# Patient Record
Sex: FEMALE | Race: WHITE | NOT HISPANIC OR LATINO | Employment: OTHER | URBAN - METROPOLITAN AREA
[De-identification: names, ages, dates, MRNs, and addresses within clinical notes are randomized per-mention and may not be internally consistent; named-entity substitution may affect disease eponyms.]

---

## 2017-06-14 ENCOUNTER — HISTORICAL (OUTPATIENT)
Dept: RADIOLOGY | Facility: HOSPITAL | Age: 57
End: 2017-06-14

## 2017-08-18 ENCOUNTER — HISTORICAL (OUTPATIENT)
Dept: ADMINISTRATIVE | Facility: HOSPITAL | Age: 57
End: 2017-08-18

## 2017-08-18 LAB
ALBUMIN SERPL-MCNC: 4.2 GM/DL (ref 3.4–5)
ALBUMIN/GLOB SERPL: 1 RATIO (ref 1–2)
ALP SERPL-CCNC: 83 UNIT/L (ref 45–117)
ALT SERPL-CCNC: 24 UNIT/L (ref 12–78)
APTT PPP: 28.1 SECOND(S) (ref 23.3–37)
AST SERPL-CCNC: 14 UNIT/L (ref 15–37)
BILIRUB SERPL-MCNC: 0.3 MG/DL (ref 0.2–1)
BILIRUBIN DIRECT+TOT PNL SERPL-MCNC: <0.1 MG/DL
BILIRUBIN DIRECT+TOT PNL SERPL-MCNC: >0.2 MG/DL
BUN SERPL-MCNC: 13 MG/DL (ref 7–18)
CALCIUM SERPL-MCNC: 9.6 MG/DL (ref 8.5–10.1)
CHLORIDE SERPL-SCNC: 103 MMOL/L (ref 98–107)
CO2 SERPL-SCNC: 32 MMOL/L (ref 21–32)
CREAT SERPL-MCNC: 0.7 MG/DL (ref 0.6–1.3)
ERYTHROCYTE [DISTWIDTH] IN BLOOD BY AUTOMATED COUNT: 12.2 % (ref 11.5–14.5)
GLOBULIN SER-MCNC: 3.9 GM/ML (ref 2.3–3.5)
GLUCOSE SERPL-MCNC: 82 MG/DL (ref 74–106)
HCT VFR BLD AUTO: 41.5 % (ref 35–46)
HGB BLD-MCNC: 13.6 GM/DL (ref 12–16)
INR PPP: 0.97 (ref 0.9–1.2)
MCH RBC QN AUTO: 29.8 PG (ref 26–34)
MCHC RBC AUTO-ENTMCNC: 32.8 GM/DL (ref 31–37)
MCV RBC AUTO: 91 FL (ref 80–100)
PLATELET # BLD AUTO: 332 X10(3)/MCL (ref 130–400)
PMV BLD AUTO: 10 FL (ref 7.4–10.4)
POTASSIUM SERPL-SCNC: 4.5 MMOL/L (ref 3.5–5.1)
PROT SERPL-MCNC: 8.1 GM/DL (ref 6.4–8.2)
PROTHROMBIN TIME: 12.7 SECOND(S) (ref 11.9–14.4)
RBC # BLD AUTO: 4.56 X10(6)/MCL (ref 4–5.2)
SODIUM SERPL-SCNC: 140 MMOL/L (ref 136–145)
WBC # SPEC AUTO: 7.9 X10(3)/MCL (ref 4.5–11)

## 2018-05-07 ENCOUNTER — HISTORICAL (OUTPATIENT)
Dept: ADMINISTRATIVE | Facility: HOSPITAL | Age: 58
End: 2018-05-07

## 2018-05-07 LAB
ALBUMIN SERPL-MCNC: 3.7 GM/DL (ref 3.4–5)
ALBUMIN/GLOB SERPL: 1 RATIO (ref 1–2)
ALP SERPL-CCNC: 83 UNIT/L (ref 45–117)
ALT SERPL-CCNC: 34 UNIT/L (ref 12–78)
APTT PPP: 27.6 SECOND(S) (ref 23.3–37)
AST SERPL-CCNC: 23 UNIT/L (ref 15–37)
BILIRUB SERPL-MCNC: 0.3 MG/DL (ref 0.2–1)
BILIRUBIN DIRECT+TOT PNL SERPL-MCNC: <0.1 MG/DL
BILIRUBIN DIRECT+TOT PNL SERPL-MCNC: ABNORMAL MG/DL
BUN SERPL-MCNC: 7 MG/DL (ref 7–18)
CALCIUM SERPL-MCNC: 9.3 MG/DL (ref 8.5–10.1)
CHLORIDE SERPL-SCNC: 104 MMOL/L (ref 98–107)
CO2 SERPL-SCNC: 28 MMOL/L (ref 21–32)
CREAT SERPL-MCNC: 0.8 MG/DL (ref 0.6–1.3)
ERYTHROCYTE [DISTWIDTH] IN BLOOD BY AUTOMATED COUNT: 12.8 % (ref 11.5–14.5)
GLOBULIN SER-MCNC: 4.1 GM/ML (ref 2.3–3.5)
GLUCOSE SERPL-MCNC: 88 MG/DL (ref 74–106)
HCT VFR BLD AUTO: 42.8 % (ref 35–46)
HGB BLD-MCNC: 13.7 GM/DL (ref 12–16)
INR PPP: 1.01 (ref 0.9–1.2)
MCH RBC QN AUTO: 29.2 PG (ref 26–34)
MCHC RBC AUTO-ENTMCNC: 32 GM/DL (ref 31–37)
MCV RBC AUTO: 91.3 FL (ref 80–100)
PLATELET # BLD AUTO: 267 X10(3)/MCL (ref 130–400)
PMV BLD AUTO: 10.3 FL (ref 7.4–10.4)
POTASSIUM SERPL-SCNC: 4.4 MMOL/L (ref 3.5–5.1)
PROT SERPL-MCNC: 7.8 GM/DL (ref 6.4–8.2)
PROTHROMBIN TIME: 12.6 SECOND(S) (ref 11.9–14.4)
RBC # BLD AUTO: 4.69 X10(6)/MCL (ref 4–5.2)
SODIUM SERPL-SCNC: 138 MMOL/L (ref 136–145)
WBC # SPEC AUTO: 9.9 X10(3)/MCL (ref 4.5–11)

## 2018-05-28 ENCOUNTER — HISTORICAL (OUTPATIENT)
Dept: SURGERY | Facility: HOSPITAL | Age: 58
End: 2018-05-28

## 2018-05-28 LAB
AMPHET UR QL SCN: NEGATIVE
BARBITURATE SCN PRESENT UR: NEGATIVE
BENZODIAZ UR QL SCN: NEGATIVE
CANNABINOIDS UR QL SCN: NEGATIVE
COCAINE UR QL SCN: POSITIVE
ETHANOL SERPL-MCNC: <3 MG/DL
OPIATES UR QL SCN: NEGATIVE
PCP UR QL: NEGATIVE
PH UR STRIP.AUTO: 6 [PH] (ref 5–8)
TEMPERATURE, URINE (OHS): 23.5 DEGC (ref 20–25)

## 2018-12-04 ENCOUNTER — HISTORICAL (OUTPATIENT)
Dept: RADIOLOGY | Facility: HOSPITAL | Age: 58
End: 2018-12-04

## 2018-12-14 ENCOUNTER — HISTORICAL (OUTPATIENT)
Dept: ADMINISTRATIVE | Facility: HOSPITAL | Age: 58
End: 2018-12-14

## 2018-12-14 LAB
APTT PPP: 27.7 SECOND(S) (ref 23.3–37)
BUN SERPL-MCNC: 17 MG/DL (ref 7–18)
CALCIUM SERPL-MCNC: 9.4 MG/DL (ref 8.5–10.1)
CHLORIDE SERPL-SCNC: 106 MMOL/L (ref 98–107)
CO2 SERPL-SCNC: 31 MMOL/L (ref 21–32)
CREAT SERPL-MCNC: 0.8 MG/DL (ref 0.6–1.3)
CREAT/UREA NIT SERPL: 21
ERYTHROCYTE [DISTWIDTH] IN BLOOD BY AUTOMATED COUNT: 13 % (ref 11.5–14.5)
GLUCOSE SERPL-MCNC: 83 MG/DL (ref 74–106)
HCT VFR BLD AUTO: 42.4 % (ref 35–46)
HGB BLD-MCNC: 13.8 GM/DL (ref 12–16)
INR PPP: 0.97 (ref 0.9–1.2)
MCH RBC QN AUTO: 30.1 PG (ref 26–34)
MCHC RBC AUTO-ENTMCNC: 32.5 GM/DL (ref 31–37)
MCV RBC AUTO: 92.6 FL (ref 80–100)
PLATELET # BLD AUTO: 294 X10(3)/MCL (ref 130–400)
PMV BLD AUTO: 10 FL (ref 7.4–10.4)
POTASSIUM SERPL-SCNC: 4.7 MMOL/L (ref 3.5–5.1)
PROTHROMBIN TIME: 12.8 SECOND(S) (ref 11.9–14.4)
RBC # BLD AUTO: 4.58 X10(6)/MCL (ref 4–5.2)
SODIUM SERPL-SCNC: 140 MMOL/L (ref 136–145)
WBC # SPEC AUTO: 10.1 X10(3)/MCL (ref 4.5–11)

## 2019-01-04 ENCOUNTER — HOSPITAL ENCOUNTER (OUTPATIENT)
Dept: MEDSURG UNIT | Facility: HOSPITAL | Age: 59
End: 2019-01-05
Attending: OTOLARYNGOLOGY | Admitting: OTOLARYNGOLOGY

## 2019-01-04 LAB
AMPHET UR QL SCN: NEGATIVE
BARBITURATE SCN PRESENT UR: NEGATIVE
BENZODIAZ UR QL SCN: NEGATIVE
CANNABINOIDS UR QL SCN: NEGATIVE
COCAINE UR QL SCN: NEGATIVE
ETHANOL SERPL-MCNC: 3 MG/DL
OPIATES UR QL SCN: NEGATIVE
PCP UR QL: NEGATIVE
PH UR STRIP.AUTO: 6 [PH] (ref 5–8)
T3FREE SERPL-MCNC: 1.93 PG/ML (ref 2.18–3.98)
T4 FREE SERPL-MCNC: 0.99 NG/DL (ref 0.76–1.46)
TEMPERATURE, URINE (OHS): 25 DEGC (ref 20–25)
TSH SERPL-ACNC: 0.55 MIU/L (ref 0.36–3.74)

## 2019-06-11 PROBLEM — M50.30 DEGENERATIVE DISC DISEASE, CERVICAL: Status: ACTIVE | Noted: 2019-06-11

## 2019-06-11 PROBLEM — M48.02 CERVICAL STENOSIS OF SPINAL CANAL: Status: ACTIVE | Noted: 2019-06-11

## 2020-08-03 ENCOUNTER — HISTORICAL (OUTPATIENT)
Dept: RADIOLOGY | Facility: HOSPITAL | Age: 60
End: 2020-08-03

## 2020-08-19 ENCOUNTER — HISTORICAL (OUTPATIENT)
Dept: RADIOLOGY | Facility: HOSPITAL | Age: 60
End: 2020-08-19

## 2021-08-23 ENCOUNTER — HISTORICAL (OUTPATIENT)
Dept: RADIOLOGY | Facility: HOSPITAL | Age: 61
End: 2021-08-23

## 2022-02-04 ENCOUNTER — HISTORICAL (OUTPATIENT)
Dept: RESPIRATORY THERAPY | Facility: HOSPITAL | Age: 62
End: 2022-02-04

## 2022-04-12 ENCOUNTER — HISTORICAL (OUTPATIENT)
Dept: ADMINISTRATIVE | Facility: HOSPITAL | Age: 62
End: 2022-04-12
Payer: MEDICAID

## 2022-04-30 VITALS
OXYGEN SATURATION: 98 % | HEIGHT: 65 IN | BODY MASS INDEX: 28.95 KG/M2 | WEIGHT: 173.75 LBS | SYSTOLIC BLOOD PRESSURE: 113 MMHG | DIASTOLIC BLOOD PRESSURE: 78 MMHG

## 2022-04-30 NOTE — DISCHARGE SUMMARY
Patient:   Chrissie Amezcua            MRN: [id number]            FIN: [id number]               Age:   58 years     Sex:  Female     :  1960   Associated Diagnoses:   None   Author:   Yumiko Mendez MD       OTOLARYNGOLOGY DISCHARGE SUMMARY     Admission Date: 2018  Discharge Date:   2018  Admitting Attending Physician: Dr. Diez  Discharging Attending Physician: Dr. Peters     Consulting Physician (s): None  Condition on discharge: Stable     Final diagnosis:   1) Multinodular goiter - symptomatic    Procedures:  1) Left thyroidectomy    HPI/ Hospital course: 59 yo F with multinodular goiter with symptoms who elected to have a total thyroidectomy after discussion of risks and benefits presneted for her procedure on 19. Patient underwent the produre the same day and was placed in ovservation post-operatively. Please review operativ enote for details. During removal of left thyroid, the left RLN was noted to be intact however, it did not stimulate persistently as a result of which an intr-op decision to stage her total thyroidectomy to present complications of bilateral RLN paresis and vocal cord dysfunction. Post-operatively, patient followed an expected course of recovery. Her voice was raspier than baseline however she had no difficuleties with PO. Her neck drain was removed on POD #1. She was discharged home on  with pain control meds, wound care, and follow up. Her total thryoidectomy will be staged if she is interested and she will require a FFL exam to evaluated her vocal cords at clinic follow-up.     Discharge Medications: Prescribed  acetaminophen-HYDROcodone (acetaminophen-hydrocodone 325 mg-5 mg oral tablet) 1 tab(s), Oral, q6hr, PRN pain, moderate  Continue  albuterol (VENTOLIN  MCG/ACT AERS)  fluticasone nasal ([facility] FLUTICASONE PROP 50 MCG)  gabapentin (GABAPENTIN 800 MG TABLET) 800 mg, Oral, TID  gabapentin (gabapentin 300 mg oral capsule) 300 mg, Oral,  TID  lansoprazole (LANSOPRAZOLE 30 MG CPDR)  loratadine (LORATADINE 10 MG TABLET) 10 mg, Oral, Daily  meloxicam (MELOXICAM 15 MG TABLET) 15 mg, Oral, Daily  traZODone (TRAZODONE 50 MG TABLET) 25 mg, Oral, qPM       Discharge Instructions:  No heavy lifting for 4 weeks  No contact sports  Regular renal/ cardiac diet      Wound care:  Please keep your wound clean and dry. It is okay to shower with running warm and soapy water however, do no soak. Avoid baths, rivers, ponds.     Follow Up: 01/17/19     Yumiko Mendez MD  [employer] Dept of Otolaryngology  [facility]

## 2022-05-05 NOTE — HISTORICAL [facility] CERNER
This is a historical note converted from [employer]. Formatting and pictures may have been removed.  Please reference [employer] for original formatting and attached multimedia. Indication for Surgery  59yo WF with MNG with compressive symptoms  Preoperative Diagnosis  multinodular goiter  Postoperative Diagnosis  multinodular goiter  Operation  Left thyroid lobectomy  Surgeon(s)  Thanh Diez (staff)  Anesthesia  GETA  Estimated Blood Loss  25cc  Findings  Enlarged, firm left thyroid lobe  Splayed RLN with poor stimulation at conclusion of case  Specimen(s)  left thyroid lobe  Complications  none  Technique  After informed consent was obtained and verified, the patient was brought to the operating suite and placed on the table in supine position. After adequate general anesthesia was obtained with NIM monitored ETT, time out was performed. Informed consent was reviewed. The table remained in place with the head of bed toward anesthesia. A shoulder roll was placed. Landmarks were palpated and marked including: thyroid notch, cricoid, and suprasternal notch. Planned incision site was marked and injected with 5 cc of 1% lidocaine with 1:100,000 epinephrine. Area was then prepped and?draped in a sterile fashion.?A number #15 blade was used to create a transverse incision approx. 2 cm above suprasternal notch. Dissection was carried out at midline through subcutaneous tissues with the use of Bovie cautery. Subplatysmal flaps were elevated superiorly to the thyroid notch and inferiorly to the clavicle. The fascia over the midline was incised using the Bovie monopolar cautery. The strap muscles were identified and divided in the midline through the median raphe and retracted.?  ?  The thyroid isthmus was encountered and the left enlarged thyroid lobe was dissected free from the deep infrahyoid muscles using blunt dissection and electrocautery. Kittners were used to bluntly dissect the thyroid  gland from the surrounding lateral tissues inferiorly and superiorly. The superior pole was identified, the vasculature was dissected until individual vessels were identified and each was ligated with either a 3-0 silk tie or cauterized using the Harmonic FOCUS+ Nate. Hemostasis was achieved and the superior pole was successfully freed from the pedicle, keeping right over the capsule of the gland. Parathyroid tissue was identified over the capsule of the gland upon inspection and this was dissected free and left in the neck. Attention was then returned to the lateral aspect of the gland. Further dissection was achieved via blunt dissection and the bulk of the gland was delivered out of the thyroid bed. Landmarks of the cricothyroid joint, the trachea, and esophagus were identified with palpation. The Lau dissector was used to spread parallel to the trajectory of the recurrent laryngeal nerve in the tissues above the tracheoesophageal groove, removing the fascial layers individually under direct inspection. The left recurrent laryngeal nerve was identified and noted to be somewhat splayed against the gland. The nerve was traced superiorly toward the cricothyroid joint. The thyroid gland was then meticulously dissected off the underlying tissues and trachea using the Lau and DeBakey forceps, bipolar cautery and Harmonic nate. Vasculature comprising the middle and inferior thyroid veins and the inferior thyroid artery were ligated by either tying with 3-0 silk suture or ligation with Harmonic nate. The gland was removed from Masters?s ligament over the trachea using Bovie monopolar cautery. All dissection was done with direct visualization of the recurrent laryngeal nerve. The thyroid gland was then divided through the isthmus using the Harmonic nate.?The nerve did have to be partially skeletonized to be freed from the gland. It was noted to be intact after?fully mobilizing the left thyroid lobe, however,  it did not reliably stimulate at this point. Thus it was decided to stage any further resection, especially without?known cancer diagnosis and primarily?management for compressive symptoms.?The?isthmus was then divided using Harmonic,?releasing the left thyroid lobe.  ?  The wound beds were copiously irrigated with saline, the bed was?then inspected and meticulous hemostasis was achieved. All instruments were removed from the patient. Surgicel was placed in the wound bed over the recurrent laryngeal nerve. A 10 mm perforated JAN drain was placed in the neck right of the incision with the drain crossing midline and running under the strap muscles. The drain was secured to the skin with a 2-0 silk suture. The strap muscles were re-approximated with a running 3-0 Vicryl suture. The platysma was then reapproximated with interrupted 3-0 Vicryl sutures. The deep tissue was reapproximated with 3-0 Vicryl suture. The skin was reapproximated with one running subcuticular 4-0 PDS suture with buried knots. The skin around the incision was cleaned. Steri-strips were placed over the incision with Dermabond. All counts were correct. The patient was returned to anesthesia for reversal, awakened and extubated without complication and transferred to recovery room in satisfactory condition
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
## 2022-09-19 ENCOUNTER — HOSPITAL ENCOUNTER (OUTPATIENT)
Dept: RADIOLOGY | Facility: HOSPITAL | Age: 62
Discharge: HOME | End: 2022-09-19
Attending: NURSE PRACTITIONER
Payer: MEDICAID

## 2022-09-19 DIAGNOSIS — E04.2 MULTINODULAR GOITER: ICD-10-CM

## 2022-09-19 PROCEDURE — 76536 US EXAM OF HEAD AND NECK: CPT | Mod: TC

## 2022-09-28 ENCOUNTER — OFFICE VISIT (OUTPATIENT)
Dept: OTOLARYNGOLOGY | Facility: CLINIC | Age: 62
End: 2022-09-28
Payer: MEDICAID

## 2022-09-28 VITALS
HEART RATE: 101 BPM | WEIGHT: 178.81 LBS | DIASTOLIC BLOOD PRESSURE: 80 MMHG | BODY MASS INDEX: 29.79 KG/M2 | HEIGHT: 65 IN | SYSTOLIC BLOOD PRESSURE: 115 MMHG | TEMPERATURE: 98 F

## 2022-09-28 DIAGNOSIS — J38.1 REINKE'S EDEMA OF VOCAL FOLDS: ICD-10-CM

## 2022-09-28 DIAGNOSIS — R09.89: Primary | ICD-10-CM

## 2022-09-28 DIAGNOSIS — R06.1 INSPIRATORY STRIDOR: ICD-10-CM

## 2022-09-28 PROCEDURE — 31575 DIAGNOSTIC LARYNGOSCOPY: CPT | Mod: PBBFAC | Performed by: NURSE PRACTITIONER

## 2022-09-28 PROCEDURE — 99214 OFFICE O/P EST MOD 30 MIN: CPT | Mod: PBBFAC | Performed by: NURSE PRACTITIONER

## 2022-09-28 PROCEDURE — 1159F PR MEDICATION LIST DOCUMENTED IN MEDICAL RECORD: ICD-10-PCS | Mod: CPTII,,, | Performed by: NURSE PRACTITIONER

## 2022-09-28 PROCEDURE — 3008F BODY MASS INDEX DOCD: CPT | Mod: CPTII,,, | Performed by: NURSE PRACTITIONER

## 2022-09-28 PROCEDURE — 31575 PR LARYNGOSCOPY, FLEXIBLE; DIAGNOSTIC: ICD-10-PCS | Mod: S$PBB,,, | Performed by: NURSE PRACTITIONER

## 2022-09-28 PROCEDURE — 3074F SYST BP LT 130 MM HG: CPT | Mod: CPTII,,, | Performed by: NURSE PRACTITIONER

## 2022-09-28 PROCEDURE — 3079F PR MOST RECENT DIASTOLIC BLOOD PRESSURE 80-89 MM HG: ICD-10-PCS | Mod: CPTII,,, | Performed by: NURSE PRACTITIONER

## 2022-09-28 PROCEDURE — 1159F MED LIST DOCD IN RCRD: CPT | Mod: CPTII,,, | Performed by: NURSE PRACTITIONER

## 2022-09-28 PROCEDURE — 3074F PR MOST RECENT SYSTOLIC BLOOD PRESSURE < 130 MM HG: ICD-10-PCS | Mod: CPTII,,, | Performed by: NURSE PRACTITIONER

## 2022-09-28 PROCEDURE — 99215 PR OFFICE/OUTPT VISIT, EST, LEVL V, 40-54 MIN: ICD-10-PCS | Mod: 25,S$PBB,, | Performed by: NURSE PRACTITIONER

## 2022-09-28 PROCEDURE — 99215 OFFICE O/P EST HI 40 MIN: CPT | Mod: 25,S$PBB,, | Performed by: NURSE PRACTITIONER

## 2022-09-28 PROCEDURE — 3008F PR BODY MASS INDEX (BMI) DOCUMENTED: ICD-10-PCS | Mod: CPTII,,, | Performed by: NURSE PRACTITIONER

## 2022-09-28 PROCEDURE — 31575 DIAGNOSTIC LARYNGOSCOPY: CPT | Mod: S$PBB,,, | Performed by: NURSE PRACTITIONER

## 2022-09-28 PROCEDURE — 3079F DIAST BP 80-89 MM HG: CPT | Mod: CPTII,,, | Performed by: NURSE PRACTITIONER

## 2022-09-28 RX ORDER — PANTOPRAZOLE SODIUM 40 MG/1
40 TABLET, DELAYED RELEASE ORAL DAILY
COMMUNITY
Start: 2022-09-23

## 2022-09-28 RX ORDER — LIDOCAINE HYDROCHLORIDE 40 MG/ML
1 INJECTION, SOLUTION RETROBULBAR ONCE
Status: DISPENSED | OUTPATIENT
Start: 2022-09-28

## 2022-09-28 RX ORDER — BUPROPION HYDROCHLORIDE 150 MG/1
150 TABLET, EXTENDED RELEASE ORAL 2 TIMES DAILY
COMMUNITY
Start: 2022-09-09

## 2022-09-28 NOTE — PROGRESS NOTES
The scope used for the exam was:  Flexible scope ENF-P4  Serial Number:  1)    [id number]    []   2)    [id number]    []   3)    [id number]    [x]   4)    [id number]    []   5)    [id number]    []   6)    [id number]    []       The scope used for the exam was:  Rigid scope   Serial Number:  1)   [id number]    []   2)   [id number]    []   3)   [id number]    []   4)    3384   []   5)    0824   []   6)    5554   []     7)   7425    []   8)   2240    []   9)   1109    []
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
## 2022-11-10 ENCOUNTER — OFFICE VISIT (OUTPATIENT)
Dept: OTOLARYNGOLOGY | Facility: CLINIC | Age: 62
End: 2022-11-10
Payer: MEDICAID

## 2022-11-10 VITALS
TEMPERATURE: 98 F | SYSTOLIC BLOOD PRESSURE: 125 MMHG | DIASTOLIC BLOOD PRESSURE: 83 MMHG | HEART RATE: 91 BPM | BODY MASS INDEX: 29.76 KG/M2 | WEIGHT: 178.63 LBS

## 2022-11-10 DIAGNOSIS — J38.1 REINKE'S EDEMA OF VOCAL FOLDS: ICD-10-CM

## 2022-11-10 DIAGNOSIS — J38.01 PARESIS OF LEFT VOCAL CORD: Primary | ICD-10-CM

## 2022-11-10 DIAGNOSIS — E04.2 MULTINODULAR THYROID: ICD-10-CM

## 2022-11-10 DIAGNOSIS — L98.9 LESION OF SKIN OF FACE: ICD-10-CM

## 2022-11-10 DIAGNOSIS — J38.3 PARADOXICAL VOCAL CORD MOTION: ICD-10-CM

## 2022-11-10 PROCEDURE — 11104 PUNCH BX SKIN SINGLE LESION: CPT | Mod: PBBFAC | Performed by: STUDENT IN AN ORGANIZED HEALTH CARE EDUCATION/TRAINING PROGRAM

## 2022-11-10 PROCEDURE — 99214 OFFICE O/P EST MOD 30 MIN: CPT | Mod: PBBFAC | Performed by: STUDENT IN AN ORGANIZED HEALTH CARE EDUCATION/TRAINING PROGRAM

## 2022-11-10 PROCEDURE — 88305 TISSUE EXAM BY PATHOLOGIST: CPT | Performed by: STUDENT IN AN ORGANIZED HEALTH CARE EDUCATION/TRAINING PROGRAM
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
## 2022-11-16 ENCOUNTER — TELEPHONE (OUTPATIENT)
Dept: OTOLARYNGOLOGY | Facility: CLINIC | Age: 62
End: 2022-11-16
Payer: MEDICAID

## 2022-11-16 LAB
DHEA SERPL-MCNC: NORMAL
ESTROGEN SERPL-MCNC: NORMAL PG/ML
INSULIN SERPL-ACNC: NORMAL U[IU]/ML
LAB AP CLINICAL INFORMATION: NORMAL
LAB AP GROSS DESCRIPTION: NORMAL
LAB AP REPORT FOOTNOTES: NORMAL
T3RU NFR SERPL: NORMAL %

## 2022-11-16 NOTE — TELEPHONE ENCOUNTER
Patient was called and informed of her biopsy results of the right temporal which came back as actinic keratosis.  She will continue to follow up with her primary care doctor for any other skin lesions.  Patient also informed that if she is not heard from [place] voice center [place] and she should give them a call to schedule that appointment with Dr. Pate.  Patient understands and states that she will call with any questions or concerns.

## 2023-09-11 ENCOUNTER — HOSPITAL ENCOUNTER (OUTPATIENT)
Dept: RADIOLOGY | Facility: HOSPITAL | Age: 63
Discharge: HOME | End: 2023-09-11
Attending: STUDENT IN AN ORGANIZED HEALTH CARE EDUCATION/TRAINING PROGRAM
Payer: MEDICAID

## 2023-09-11 DIAGNOSIS — J38.01 PARESIS OF LEFT VOCAL CORD: ICD-10-CM

## 2023-09-11 DIAGNOSIS — J38.3 PARADOXICAL VOCAL CORD MOTION: ICD-10-CM

## 2023-09-11 DIAGNOSIS — E04.2 MULTINODULAR THYROID: ICD-10-CM

## 2023-09-11 DIAGNOSIS — J38.1 REINKE'S EDEMA OF VOCAL FOLDS: ICD-10-CM

## 2023-09-11 PROCEDURE — 76536 US EXAM OF HEAD AND NECK: CPT | Mod: TC
